# Patient Record
(demographics unavailable — no encounter records)

---

## 2025-01-14 NOTE — PHYSICAL EXAM
[No Acute Distress] : no acute distress [Normal Venous Pressure] : normal venous pressure [Normal S1, S2] : normal S1, S2 [No Rub] : no rub [No Gallop] : no gallop [Murmur] : murmur [No Respiratory Distress] : no respiratory distress  [Soft] : abdomen soft [Non Tender] : non-tender [Normal Gait] : normal gait [No Edema] : no edema [No Cyanosis] : no cyanosis [Moves all extremities] : moves all extremities [No Focal Deficits] : no focal deficits [Normal Speech] : normal speech [Alert and Oriented] : alert and oriented [de-identified] : 2/6 syst m [de-identified] : Diminished Breath Sounds BL

## 2025-01-14 NOTE — ASSESSMENT
[FreeTextEntry1] : 87-year-old Female CAD/ Dyslipidemia/ Hypertension/ S/p Coronary Stent Placement/ S/p STEMI / SOB   Cont ASA cont bb 50 bid and arb 100 B/p monitoring at home  labs reviewed; low salt diet discussed Aggressive risk modifications.  Low chol diet, low Na  F/u PCP Pulm f/up advised F/u in 6 months

## 2025-01-14 NOTE — HISTORY OF PRESENT ILLNESS
[FreeTextEntry1] : Pt is 87 year old Female with PMH HTN, DJD, restless leg syndrome, Vertigo.   S/p hospitalization S/p STEMI ,Cardiac Cath on 5/11/22. S/p PCI KIMBERLY x 2.   Pt denies chest pain, no palpitations or sob Hx of cigarettes smoking x 40 years 1 pack per day, Quit 20 years ago  bp has been labile - non complaint with low salt diet 01/03/24 Chol 140 Trig 168 LDL 69 HgbA1C 6.1  09/01/22 EF 65% Grade IDD Mild to Mod MR, Mod TR

## 2025-01-14 NOTE — REASON FOR VISIT
[Symptom and Test Evaluation] : symptom and test evaluation [CV Risk Factors and Non-Cardiac Disease] : CV risk factors and non-cardiac disease [Arrhythmia/ECG Abnorrmalities] : arrhythmia/ECG abnormalities [Hyperlipidemia] : hyperlipidemia [Hypertension] : hypertension [Coronary Artery Disease] : coronary artery disease [Other: _____] : [unfilled]

## 2025-01-14 NOTE — REVIEW OF SYSTEMS
[Feeling Fatigued] : feeling fatigued [Hearing Loss] : hearing loss [Dyspnea on exertion] : dyspnea during exertion [Chest Discomfort] : no chest discomfort [Lower Ext Edema] : no extremity edema [Leg Claudication] : no intermittent leg claudication [Palpitations] : no palpitations [Orthopnea] : no orthopnea [PND] : no PND [Syncope] : no syncope [Wheezing] : wheezing [Negative] : Heme/Lymph